# Patient Record
Sex: MALE | Race: WHITE | NOT HISPANIC OR LATINO | URBAN - NONMETROPOLITAN AREA
[De-identification: names, ages, dates, MRNs, and addresses within clinical notes are randomized per-mention and may not be internally consistent; named-entity substitution may affect disease eponyms.]

---

## 2019-09-19 ENCOUNTER — IMPORTED ENCOUNTER (OUTPATIENT)
Dept: URBAN - NONMETROPOLITAN AREA CLINIC 1 | Facility: CLINIC | Age: 79
End: 2019-09-19

## 2019-09-19 PROBLEM — H02.831: Noted: 2019-09-19

## 2019-09-19 PROBLEM — H18.51: Noted: 2019-09-19

## 2019-09-19 PROBLEM — Z96.1: Noted: 2019-09-19

## 2019-09-19 PROBLEM — E11.9: Noted: 2019-09-19

## 2019-09-19 PROBLEM — H16.223: Noted: 2019-09-19

## 2019-09-19 PROBLEM — H35.371: Noted: 2019-09-19

## 2019-09-19 PROCEDURE — 92012 INTRM OPH EXAM EST PATIENT: CPT

## 2019-09-19 PROCEDURE — 92015 DETERMINE REFRACTIVE STATE: CPT

## 2019-09-19 NOTE — PATIENT DISCUSSION
Presbyopia OU- Discussed diagnosis in detail with patient- Patient defers MR today - Continue to monitorNIDDM (Since 1992)- Discussed diagnosis in detail with patient- Stressed importance of good blood sugar control- Recommend no soda’s- Patient reports last A1C was 6 - No diabetic retinopathy seen on today's exam- Letter to [address] to monitorCorneal Transplant OU [id number] and 2014- Discussed diagnosis in detail with patient- OS has been done twice due to rejection and the last one was done in 2014.  OD has been done once- Patient has been followed by [facility] previously- Patient is currently using Prednisolone QD OS - Patient would like to return in 3-4 months to recheck and to see if possible referral to [facility] is necessary - Continue to monitorPseudophakia OU - Discussed diagnosis in detail with patient- Patient is stable and doing well with no glare complaint- Continue to monitorERM OD - Discussed diagnosis in detail with patient- Recommend patient continue following the [address] patient to call or come into the office ASAP if any changes noted from today- 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

## 2019-10-14 ENCOUNTER — IMPORTED ENCOUNTER (OUTPATIENT)
Dept: URBAN - NONMETROPOLITAN AREA CLINIC 1 | Facility: CLINIC | Age: 79
End: 2019-10-14

## 2019-10-14 PROBLEM — H35.371: Noted: 2019-10-14

## 2019-10-14 PROBLEM — H18.232: Noted: 2019-10-14

## 2019-10-14 PROBLEM — H16.223: Noted: 2019-10-14

## 2019-10-14 PROBLEM — E11.9: Noted: 2019-10-14

## 2019-10-14 PROBLEM — H02.831: Noted: 2019-10-14

## 2019-10-14 PROBLEM — H18.51: Noted: 2019-10-14

## 2019-10-14 PROBLEM — Z96.1: Noted: 2019-10-14

## 2019-10-14 PROCEDURE — 99213 OFFICE O/P EST LOW 20 MIN: CPT

## 2019-10-14 NOTE — PATIENT DISCUSSION
Corneal Edema 2* Corneal Transplant- Discussed diagnosis in detail with patient- Patient c/o blurred vision OS that fluctuates but PH is 20/80- He did not update glasses in September. - Mild Corneal Edema noted on today's exam patient didn't  the Axonify sent last visit. Recommend starting Alyssa 128 arben QHS. Rx sent to pharamcy again today - Increase Prednisolone to BID - Mac OCT done today: partial macular hole noted OS today baseline MAC OCT done today. I think patients vision fluctuations are related to the corneal edema. - RTC 1 weekPresbyopia OU- Discussed diagnosis in detail with patient- Continue to monitorNIDDM (Since 1992)- Discussed diagnosis in detail with patient- Stressed importance of good blood sugar control- Recommend no soda’s- Patient reports last A1C was 6 - No diabetic retinopathy seen on today's exam- Letter to [address] to monitorCorneal Transplant OU [id number] and 2014- Discussed diagnosis in detail with patient- OS has been done twice due to rejection and the last one was done in 2014.  OD has been done once- Patient has been followed by [facility] previously- Patient is currently using Prednisolone QD OS - Patient would like to return in 3-4 months to recheck and to see if possible referral to [facility] is necessary - Continue to monitorPseudophakia OU - Discussed diagnosis in detail with patient- Patient is stable and doing well with no glare complaint- Continue to monitorERM OD - Discussed diagnosis in detail with patient- Recommend patient continue following the [address] patient to call or come into the office ASAP if any changes noted from today- 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 - Continue to monitorDermatochalasis OU- Discussed diagnosis in detail with patient- No superior field of vision loss- Continue to monitor

## 2019-10-21 ENCOUNTER — IMPORTED ENCOUNTER (OUTPATIENT)
Dept: URBAN - NONMETROPOLITAN AREA CLINIC 1 | Facility: CLINIC | Age: 79
End: 2019-10-21

## 2019-10-21 PROCEDURE — 99213 OFFICE O/P EST LOW 20 MIN: CPT

## 2019-10-21 NOTE — PATIENT DISCUSSION
Corneal Edema 2* Corneal Transplant improving- Discussed diagnosis in detail with patient- Patient c/o blurred vision OS that fluctuates but PH is 20/80- Continue Alyssa 128 ointment at bedtime - Increase Prednisolone to BID - Mac OCT done previously: partial macular hole noted OS today baseline MAC OCT done today. I think patients vision fluctuations are related to the corneal edema.- Patient would like to go back to [facility]  - Continue to monitor ------------------------previous notes------------------------------------Presbyopia OU- Discussed diagnosis in detail with patient- Continue to monitorNIDDM (Since 1992)- Discussed diagnosis in detail with patient- Stressed importance of good blood sugar control- Recommend no soda’s- Patient reports last A1C was 6 - No diabetic retinopathy seen on today's exam- Letter to [address] to monitorCorneal Transplant OU [id number] and 2014- Discussed diagnosis in detail with patient- OS has been done twice due to rejection and the last one was done in 2014.  OD has been done once- Patient has been followed by [facility] previously- Patient is currently using Prednisolone QD OS - Patient would like to return in 3-4 months to recheck and to see if possible referral to [facility] is necessary - Continue to monitorPseudophakia OU - Discussed diagnosis in detail with patient- Patient is stable and doing well with no glare complaint- Continue to monitorERM OD - Discussed diagnosis in detail with patient- Recommend patient continue following the [address] patient to call or come into the office ASAP if any changes noted from today- 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 - Continue to monitorDermatochalasis OU- Discussed diagnosis in detail with patient- No superior field of vision loss- Continue to monitor

## 2019-11-14 ENCOUNTER — IMPORTED ENCOUNTER (OUTPATIENT)
Dept: URBAN - NONMETROPOLITAN AREA CLINIC 1 | Facility: CLINIC | Age: 79
End: 2019-11-14

## 2019-11-14 PROBLEM — H43.812: Noted: 2019-11-14

## 2019-11-14 PROBLEM — H35.371: Noted: 2019-10-14

## 2019-11-14 PROBLEM — H02.831: Noted: 2019-10-14

## 2019-11-14 PROBLEM — H18.51: Noted: 2019-10-14

## 2019-11-14 PROBLEM — H16.223: Noted: 2019-10-14

## 2019-11-14 PROBLEM — Z96.1: Noted: 2019-10-14

## 2019-11-14 PROBLEM — E11.9: Noted: 2019-10-14

## 2019-11-14 PROCEDURE — 92250 FUNDUS PHOTOGRAPHY W/I&R: CPT

## 2019-11-14 PROCEDURE — 99214 OFFICE O/P EST MOD 30 MIN: CPT

## 2019-11-14 NOTE — PATIENT DISCUSSION
PVD OS:  -  Discussed findings of exam in detail with the patient. -  The risk of retinal detachment in patients with PVDs was discussed with the patient and the warning signs of retinal detachment were carefully reviewed with the patient. -  The patient was warned to return to the office or contact the ophthalmologist on call immediately if they experience signs of retinal detachment or changes in vision noted from today. -  Continue to monitor. Presbyopia OU- Discussed diagnosis in detail with patient- Continue to monitorNIDDM (Since 1992)- Discussed diagnosis in detail with patient- Stressed importance of good blood sugar control- Recommend no soda’s- Patient reports last A1C was 6 - No diabetic retinopathy seen on today's exam- Letter to [address] to monitorCorneal Transplant OU [id number] and 2014- Discussed diagnosis in detail with patient- Patient c/o blurred vision OS that fluctuates at times due to edema- Continue Alyssa 128 ointment at bedtime PRN- Continue Prednisolone BID - OS has been done twice due to rejection and the last one was done in 2014. OD has been done once. 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 - Continue to monitorDermatochalasis OU- Discussed diagnosis in detail with patient- No superior field of vision loss- Continue to monitor

## 2020-02-07 ENCOUNTER — IMPORTED ENCOUNTER (OUTPATIENT)
Dept: URBAN - NONMETROPOLITAN AREA CLINIC 1 | Facility: CLINIC | Age: 80
End: 2020-02-07

## 2020-02-07 PROBLEM — E11.9: Noted: 2020-02-07

## 2020-02-07 PROBLEM — H16.223: Noted: 2020-02-07

## 2020-02-07 PROBLEM — H35.371: Noted: 2020-02-07

## 2020-02-07 PROBLEM — Z96.1: Noted: 2020-02-07

## 2020-02-07 PROBLEM — H43.812: Noted: 2020-02-07

## 2020-02-07 PROBLEM — H18.51: Noted: 2020-02-07

## 2020-02-07 PROBLEM — H02.831: Noted: 2019-10-14

## 2020-02-07 PROCEDURE — 99213 OFFICE O/P EST LOW 20 MIN: CPT

## 2020-02-07 NOTE — PATIENT DISCUSSION
Corneal Transplant OU [id number] and 2014- Discussed diagnosis in detail with patient- Patient c/o blurred vision OS that fluctuates at times due to edema- Continue Alyssa 128 ointment at bedtime. Patient has not been using. recommend patient start using at bedtime  - Patient has been using Alyssa 128 drops recommend patient to continue using  - Continue Prednisolone BID patient has only been using QD in OS recommend patient  QD OD and OS BID RX sent to pharmacy- OS has been done twice due to rejection and the last one was done in 2014. OD has been done once. 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 -  The risk of retinal detachment in patients with PVDs was discussed with the patient and the warning signs of retinal detachment were carefully reviewed with the patient. -  The patient was warned to return to the office or contact the ophthalmologist on call immediately if they experience signs of retinal detachment or changes in vision noted from today. -  Continue to monitor.  Presbyopia OU- Discussed diagnosis in detail with patient- Continue to monitor

## 2021-06-29 ENCOUNTER — IMPORTED ENCOUNTER (OUTPATIENT)
Dept: URBAN - NONMETROPOLITAN AREA CLINIC 1 | Facility: CLINIC | Age: 81
End: 2021-06-29

## 2021-06-29 PROCEDURE — 92015 DETERMINE REFRACTIVE STATE: CPT

## 2021-06-29 PROCEDURE — 92012 INTRM OPH EXAM EST PATIENT: CPT

## 2021-06-29 NOTE — PATIENT DISCUSSION
Corneal Transplant OU [id number] and 2014 2020- Discussed diagnosis in detail with patient- D/C Alyssa 128 ointment (per [facility])- Continue Prednisolone TID OS (per [facility] recommendations)- Patient states that he had another corneal transplant in 2020 in OS  and has a upcoming appointment in August - Patient has had a transplant done once in the OD and 5 times in OS - Continue to monitor NIDDM (Since 1992)- Discussed diagnosis in detail with patient- Stressed importance of good blood sugar control- Recommend no soda’s- Patient reports last A1C was 6.4- No diabetic retinopathy seen on today's exam- Letter to 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 -  The risk of retinal detachment in patients with PVDs was discussed with the patient and the warning signs of retinal detachment were carefully reviewed with the patient. -  The patient was warned to return to the office or contact the ophthalmologist on call immediately if they experience signs of retinal detachment or changes in vision noted from today. -  Continue to monitor.  Presbyopia OU- Discussed diagnosis in detail with patient- Recommend HOLD[employer] RX patient is S/P post op OS from corneal transplant - ADVISED patient to be very careful when driving - DMV form filled out today- PAtient to call and schedule appointment for MR to be done by Dr. Trina Zapata after seeing [facility] in August- Continue to monitor

## 2021-07-08 PROBLEM — H18.511: Noted: 2021-07-08

## 2021-07-08 PROBLEM — E11.9: Noted: 2021-06-29

## 2021-07-08 PROBLEM — H35.371: Noted: 2020-02-07

## 2021-07-08 PROBLEM — H43.812: Noted: 2020-02-07

## 2021-07-08 PROBLEM — H02.831: Noted: 2019-10-14

## 2021-07-08 PROBLEM — H16.223: Noted: 2021-06-29

## 2021-07-08 PROBLEM — Z96.1: Noted: 2020-02-07

## 2022-02-21 ENCOUNTER — ESTABLISHED PATIENT (OUTPATIENT)
Dept: URBAN - NONMETROPOLITAN AREA CLINIC 1 | Facility: CLINIC | Age: 82
End: 2022-02-21

## 2022-02-21 DIAGNOSIS — H52.4: ICD-10-CM

## 2022-02-21 DIAGNOSIS — E11.9: ICD-10-CM

## 2022-02-21 DIAGNOSIS — Z79.4: ICD-10-CM

## 2022-02-21 PROCEDURE — 92015 DETERMINE REFRACTIVE STATE: CPT

## 2022-02-21 PROCEDURE — 92014 COMPRE OPH EXAM EST PT 1/>: CPT

## 2022-02-21 ASSESSMENT — VISUAL ACUITY
OS_PH: 20/60-1
OD_PH: 20/30-1
OD_CC: 20/50-1
OS_CC: 20/60-1

## 2022-02-21 ASSESSMENT — TONOMETRY
OD_IOP_MMHG: 10
OS_IOP_MMHG: 12

## 2022-02-21 NOTE — PATIENT DISCUSSION
Discussed diagnosis in detail with patient. No diabetic retinopathy noted on exam today. Stressed importance of good blood sugar control and how it can affect ocular health/vision. Recommend no soda’s.  Letter will be sent to PCP today as well (Dr. Bernard Conner). Continue to monitor closely for changes.

## 2022-02-21 NOTE — PATIENT DISCUSSION
Discussed diagnosis in detail with patient. [employer] RX given today. Mr done today by Dr. Kelsey Kilpatrick Continue to monitor. (Recommend NO night time driving). DMV form filled out today.

## 2022-02-21 NOTE — PATIENT DISCUSSION
Discussed diagnosis in detail with patient. Recommend patient follow the [address] [facility], patient to call or come into the office ASAP if any changes noted from today. Continue to monitor.

## 2022-02-21 NOTE — PATIENT DISCUSSION
Patient is still being followed by [facility] and has upcoming appointment March 15th.

## 2022-04-09 ASSESSMENT — TONOMETRY
OD_IOP_MMHG: 10
OD_IOP_MMHG: 11
OD_IOP_MMHG: 08
OS_IOP_MMHG: 14
OS_IOP_MMHG: 10
OD_IOP_MMHG: 10
OD_IOP_MMHG: 10
OS_IOP_MMHG: 13
OS_IOP_MMHG: 11
OS_IOP_MMHG: 15
OS_IOP_MMHG: 10
OD_IOP_MMHG: 10

## 2022-04-09 ASSESSMENT — VISUAL ACUITY
OS_SC: 20/200
OS_PH: 20/200-
OD_PH: 20/30-1
OS_SC: 20/100+1
OS_PH: 20/100-
OS_SC: 20/70+1
OS_PH: 20/63-
OD_SC: 20/50-2
OD_PH: 20/40
OS_PH: 20/70-1
OS_SC: 20/400
OS_PH: 20/80
OD_PH: 20/30-1
OS_SC: 20/400
OD_SC: 20/40-2
OD_SC: 20/40
OS_PH: 20/50+2
OD_PH: 20/30
OD_SC: 20/30-2